# Patient Record
Sex: FEMALE | Race: WHITE | NOT HISPANIC OR LATINO | Employment: FULL TIME | URBAN - METROPOLITAN AREA
[De-identification: names, ages, dates, MRNs, and addresses within clinical notes are randomized per-mention and may not be internally consistent; named-entity substitution may affect disease eponyms.]

---

## 2019-08-19 ENCOUNTER — PREP FOR SURGERY (OUTPATIENT)
Dept: OTHER | Facility: HOSPITAL | Age: 44
End: 2019-08-19

## 2019-08-19 DIAGNOSIS — D25.9 FIBROID UTERUS: Primary | ICD-10-CM

## 2019-08-19 RX ORDER — CEFAZOLIN SODIUM 2 G/100ML
2 INJECTION, SOLUTION INTRAVENOUS ONCE
Status: CANCELLED | OUTPATIENT
Start: 2019-08-19 | End: 2019-08-19

## 2019-08-19 RX ORDER — SCOLOPAMINE TRANSDERMAL SYSTEM 1 MG/1
1 PATCH, EXTENDED RELEASE TRANSDERMAL ONCE
Status: CANCELLED | OUTPATIENT
Start: 2019-08-19 | End: 2019-08-19

## 2019-08-19 RX ORDER — HEPARIN SODIUM 5000 [USP'U]/ML
5000 INJECTION, SOLUTION INTRAVENOUS; SUBCUTANEOUS ONCE
Status: CANCELLED | OUTPATIENT
Start: 2019-08-19 | End: 2019-08-19

## 2019-08-20 ENCOUNTER — APPOINTMENT (OUTPATIENT)
Dept: PREADMISSION TESTING | Facility: HOSPITAL | Age: 44
End: 2019-08-20

## 2019-08-20 ENCOUNTER — PREP FOR SURGERY (OUTPATIENT)
Dept: OTHER | Facility: HOSPITAL | Age: 44
End: 2019-08-20

## 2019-08-20 VITALS — BODY MASS INDEX: 30.1 KG/M2 | HEIGHT: 67 IN | WEIGHT: 191.8 LBS

## 2019-08-20 DIAGNOSIS — Z01.89 LABORATORY TEST: Primary | ICD-10-CM

## 2019-08-20 DIAGNOSIS — D25.9 FIBROID UTERUS: ICD-10-CM

## 2019-08-20 DIAGNOSIS — D25.9 UTERINE LEIOMYOMA, UNSPECIFIED LOCATION: ICD-10-CM

## 2019-08-20 LAB
ABO GROUP BLD: NORMAL
B-HCG UR QL: NEGATIVE
BILIRUB UR QL STRIP: NEGATIVE
CLARITY UR: CLEAR
COLOR UR: YELLOW
DEPRECATED RDW RBC AUTO: 47.2 FL (ref 37–54)
ERYTHROCYTE [DISTWIDTH] IN BLOOD BY AUTOMATED COUNT: 14.1 % (ref 12.3–15.4)
GLUCOSE UR STRIP-MCNC: NEGATIVE MG/DL
HCT VFR BLD AUTO: 39.2 % (ref 34–46.6)
HGB BLD-MCNC: 11.9 G/DL (ref 12–15.9)
HGB UR QL STRIP.AUTO: NEGATIVE
KETONES UR QL STRIP: NEGATIVE
LEUKOCYTE ESTERASE UR QL STRIP.AUTO: NEGATIVE
MCH RBC QN AUTO: 27.5 PG (ref 26.6–33)
MCHC RBC AUTO-ENTMCNC: 30.4 G/DL (ref 31.5–35.7)
MCV RBC AUTO: 90.7 FL (ref 79–97)
NITRITE UR QL STRIP: NEGATIVE
PH UR STRIP.AUTO: 7 [PH] (ref 5–8)
PLATELET # BLD AUTO: 254 10*3/MM3 (ref 140–450)
PMV BLD AUTO: 9.9 FL (ref 6–12)
PROT UR QL STRIP: NEGATIVE
RBC # BLD AUTO: 4.32 10*6/MM3 (ref 3.77–5.28)
RH BLD: POSITIVE
SP GR UR STRIP: 1.01 (ref 1–1.03)
UROBILINOGEN UR QL STRIP: NORMAL
WBC NRBC COR # BLD: 5.86 10*3/MM3 (ref 3.4–10.8)

## 2019-08-20 PROCEDURE — 85027 COMPLETE CBC AUTOMATED: CPT | Performed by: OBSTETRICS & GYNECOLOGY

## 2019-08-20 PROCEDURE — 93010 ELECTROCARDIOGRAM REPORT: CPT | Performed by: INTERNAL MEDICINE

## 2019-08-20 PROCEDURE — 86900 BLOOD TYPING SEROLOGIC ABO: CPT

## 2019-08-20 PROCEDURE — 93005 ELECTROCARDIOGRAM TRACING: CPT

## 2019-08-20 PROCEDURE — 81003 URINALYSIS AUTO W/O SCOPE: CPT | Performed by: OBSTETRICS & GYNECOLOGY

## 2019-08-20 PROCEDURE — 81025 URINE PREGNANCY TEST: CPT | Performed by: OBSTETRICS & GYNECOLOGY

## 2019-08-20 PROCEDURE — 86901 BLOOD TYPING SEROLOGIC RH(D): CPT

## 2019-08-20 PROCEDURE — 36415 COLL VENOUS BLD VENIPUNCTURE: CPT

## 2019-08-20 RX ORDER — ACETAMINOPHEN 160 MG
1 TABLET,DISINTEGRATING ORAL EVERY MORNING
COMMUNITY

## 2019-08-20 RX ORDER — LISINOPRIL AND HYDROCHLOROTHIAZIDE 25; 20 MG/1; MG/1
1 TABLET ORAL EVERY MORNING
COMMUNITY

## 2019-08-20 RX ORDER — ALPRAZOLAM 0.25 MG/1
0.25 TABLET ORAL 3 TIMES DAILY PRN
COMMUNITY

## 2019-08-20 RX ORDER — METHIMAZOLE 10 MG/1
5 TABLET ORAL TAKE AS DIRECTED
COMMUNITY
End: 2019-11-26 | Stop reason: DRUGHIGH

## 2019-08-20 RX ORDER — DOXYCYCLINE HYCLATE 50 MG/1
324 CAPSULE, GELATIN COATED ORAL
COMMUNITY

## 2019-08-20 NOTE — H&P
Visit Type:  Preoperative visit  Primary Provider:  Jeni Floyd DO      History of Present Illness:  Lorraine presents with her  for her pre-operative visit for her TRH with bilateral salpingectomy for her fibroid uterus and menorraghia. All questions were answered today.      Review of Systems            Complains of abnormal/painful periods and abnormal bleeding.       Denies abnormal vaginal discharge, vaginal itching or burning, urinary incontinence, urinary urgency, urinary frequency, pelvic pain, bloody urine, vaginal dryness, painful urination and painful intercourse.    Except as noted in the HPI, the review of systems is negative for General, CV, Resp, GI, Endo, Derm, Psych and ENT.    Birth Control Method: Not Applicable  Age at Menarche: 13  Date of LMP: 2019  Interval Btwn Periods: 28  Menses: Medium  Bleeding between Periods: No    OB History     T: 2  L: 2  Past Medical History: Abnormal Pap, Acid Reflux, Anxiety, Fibroid Uterine, HTN, and Hyperthyroid.   Past Surgical History: Gallbladder, Liver duct repair, and Left breast biopsy.      Past Medical History:     Reviewed history from 2019 and no changes required:        Abnormal Pap, Acid Reflux, Anxiety, Fibroid Uterine, HTN, and Hyperthyroid.     Past Surgical History:     Reviewed history from 2019 and no changes required:        Gallbladder, Liver duct repair, and Left breast biopsy.          Risk Factors:     Smoked Tobacco Use:  Never smoker  Smokeless Tobacco Use:  Never  Passive smoke exposure:  no  Drug use:  no  Alcohol use:  no  Sun Exposure:  occasionally    Previous Tobacco Use: Signed On 2019  Smoked Tobacco Use:  Never smoker  Smokeless Tobacco Use:  Never  Passive smoke exposure:  no  Drug use:  no    Previous Alcohol Use: Signed On 2019  Alcohol use:  no  Sun Exposure:  occasionally      Vital Signs:    Patient Profile: 44 Years Old Female  Height:  67 inches (170.18 cm)  Weight:    194  pounds  BMI:  30.38  Pulse rate: 80 / minute  BP sittin / 708      Physical Exam    General:      well developed, well nourished, in no acute distress  Lungs:      clear bilaterally to A & P  Heart:      regular rate and rhythm, S1, S2 without murmurs, rubs, gallops, or clicks  Abdomen:      bowel sounds positive; abdomen soft and non-tender without masses, organomegaly, or hernias noted  Genitalia:      normal female external genitalia  Msk:      no deformity or scoliosis noted with normal posture and gait  Psych:      alert and cooperative; normal mood and affect; normal attention span and concentration        Impression & Recommendations:    Problem # 1:  Fibroids, uterus (ICD-218.9) (ERP81-D89.9)  Patient's diagnosis and possible treatment options were again reviewed, including lesser invasive options.  The risks, benefits and likely outcomes of each were reviewed.  The patient confirms her desire to proceed with TRH with possible BSO.  The procedure was again defined as robotic removal of uterus, cervix and possible removal of the fallopian tubes and ovaries.  She has asked me to use my judgement the the decision to remove the ovaries at the time of surgical exploration.  I have informed her that clinical suspicion of endometriosis cannot always be confirmed with pathological examination of the surgical specimen.  Recognized risks of this procedure are those of any laparoscopic surgical procedure including anesthesia, bleeding, infection, damage to bowel or bladder and neurologic damage.  Specific risks of this procedure were also reviewed including ureteral damage, DVT and its sequelae, vaginal cuff hematoma or infection, wound dehiscence, ovarian remnants, as well as the general management of these complications.  She was informed of the possible need to convert the laparoscopic procedure to an open procedure after the induction of anesthesia.  Possible indications for post operative ERT and its risks  and benefits were reviewed.  She was also advised that bleeding, bowel injury, bladder injury or ureteral injury may not be obvious at the time of surgery and that she would need to be viligent to call my office ASAP with any increasing abdominopelvic pain or any fever in the two weeks following surgery.  All of the patients questions were answered and she was advised to call with any other questions that she may have prior to the operation.    Orders:  Pre Op Visit-[id number] (CPT-[id number])        New Orders:  Pre Op Visit-[id number] [[id number]]      Problems Added:    Fibroids, uterus (ICD-218.9) ([id number]-H95.9)  ]      Electronically signed by Jeni Floyd DO on 08/20/2019 at 5:24 PM    ________________________________________________________________________

## 2019-08-20 NOTE — PAT
Too early to draw type and screen and potassium in PAT.  Please obtain specimen in pre-op on the day of surgery.    Patient to apply Chlorhexadine wipes  to surgical area (as instructed) the night before procedure and the AM of procedure. Wipes provided.    Latex allergy.       When verifying op consent with patient.  Patient stated it was incorrect in computer and Dr. Floyd told her to tell  So to be corrected in PAT.   She plans to keep her Ovaries.       Called and spoke to Sadi Lim, gave correct order for operative permit that matches case request.     Incorrect order removed from Epic and correct order entered.

## 2019-08-23 ENCOUNTER — PREP FOR SURGERY (OUTPATIENT)
Dept: OTHER | Facility: HOSPITAL | Age: 44
End: 2019-08-23

## 2019-08-23 NOTE — H&P
Visit Type:  Preoperative visit  Primary Provider:  Jeni Floyd DO      History of Present Illness:  Lorraine presents with her  for her pre-operative visit for her TRH with bilateral salpingectomy for her fibroid uterus and menorraghia. All questions were answered today.      Review of Systems            Complains of abnormal/painful periods and abnormal bleeding.       Denies abnormal vaginal discharge, vaginal itching or burning, urinary incontinence, urinary urgency, urinary frequency, pelvic pain, bloody urine, vaginal dryness, painful urination and painful intercourse.    Except as noted in the HPI, the review of systems is negative for General, CV, Resp, GI, Endo, Derm, Psych and ENT.    Birth Control Method: Not Applicable  Age at Menarche: 13  Date of LMP: 2019  Interval Btwn Periods: 28  Menses: Medium  Bleeding between Periods: No    OB History     T: 2  L: 2  Past Medical History: Abnormal Pap, Acid Reflux, Anxiety, Fibroid Uterine, HTN, and Hyperthyroid.   Past Surgical History: Gallbladder, Liver duct repair, and Left breast biopsy.      Past Medical History:     Reviewed history from 2019 and no changes required:        Abnormal Pap, Acid Reflux, Anxiety, Fibroid Uterine, HTN, and Hyperthyroid.     Past Surgical History:     Reviewed history from 2019 and no changes required:        Gallbladder, Liver duct repair, and Left breast biopsy.          Risk Factors:     Smoked Tobacco Use:  Never smoker  Smokeless Tobacco Use:  Never  Passive smoke exposure:  no  Drug use:  no  Alcohol use:  no  Sun Exposure:  occasionally    Previous Tobacco Use: Signed On 2019  Smoked Tobacco Use:  Never smoker  Smokeless Tobacco Use:  Never  Passive smoke exposure:  no  Drug use:  no    Previous Alcohol Use: Signed On 2019  Alcohol use:  no  Sun Exposure:  occasionally      Vital Signs:    Patient Profile: 44 Years Old Female  Height:  67 inches (170.18 cm)  Weight:    194  pounds  BMI:  30.38  Pulse rate: 80 / minute  BP sittin / 708      Physical Exam    General:      well developed, well nourished, in no acute distress  Lungs:      clear bilaterally to A & P  Heart:      regular rate and rhythm, S1, S2 without murmurs, rubs, gallops, or clicks  Abdomen:      bowel sounds positive; abdomen soft and non-tender without masses, organomegaly, or hernias noted  Genitalia:      normal female external genitalia  Msk:      no deformity or scoliosis noted with normal posture and gait  Psych:      alert and cooperative; normal mood and affect; normal attention span and concentration        Impression & Recommendations:    Problem # 1:  Fibroids, uterus (ICD-218.9) (KYU31-H58.9)  Patient's diagnosis and possible treatment options were again reviewed, including lesser invasive options.  The risks, benefits and likely outcomes of each were reviewed.  The patient confirms her desire to proceed with TRH with possible BSO.  The procedure was again defined as robotic removal of uterus, cervix and possible removal of the fallopian tubes and ovaries.  She has asked me to use my judgement the the decision to remove the ovaries at the time of surgical exploration.  I have informed her that clinical suspicion of endometriosis cannot always be confirmed with pathological examination of the surgical specimen.  Recognized risks of this procedure are those of any laparoscopic surgical procedure including anesthesia, bleeding, infection, damage to bowel or bladder and neurologic damage.  Specific risks of this procedure were also reviewed including ureteral damage, DVT and its sequelae, vaginal cuff hematoma or infection, wound dehiscence, ovarian remnants, as well as the general management of these complications.  She was informed of the possible need to convert the laparoscopic procedure to an open procedure after the induction of anesthesia.  Possible indications for post operative ERT and its risks  and benefits were reviewed.  She was also advised that bleeding, bowel injury, bladder injury or ureteral injury may not be obvious at the time of surgery and that she would need to be viligent to call my office ASAP with any increasing abdominopelvic pain or any fever in the two weeks following surgery.  All of the patients questions were answered and she was advised to call with any other questions that she may have prior to the operation.    Orders:  Pre Op Visit-[id number] (CPT-[id number])        New Orders:  Pre Op Visit-[id number] [CPT-[id number]]      Problems Added:    Fibroids, uterus (ICD-218.9) (PGT28-T34.9)  ]      Electronically signed by Jeni Floyd DO on 08/20/2019 at 5:24 PM    ________________________________________________________________________

## 2019-08-25 ENCOUNTER — ANESTHESIA EVENT (OUTPATIENT)
Dept: PERIOP | Facility: HOSPITAL | Age: 44
End: 2019-08-25

## 2019-08-25 RX ORDER — FAMOTIDINE 10 MG/ML
20 INJECTION, SOLUTION INTRAVENOUS ONCE
Status: CANCELLED | OUTPATIENT
Start: 2019-08-25 | End: 2019-08-25

## 2019-08-26 ENCOUNTER — HOSPITAL ENCOUNTER (INPATIENT)
Facility: HOSPITAL | Age: 44
LOS: 1 days | Discharge: HOME | End: 2019-08-27
Attending: OBSTETRICS & GYNECOLOGY | Admitting: OBSTETRICS & GYNECOLOGY

## 2019-08-26 ENCOUNTER — ANESTHESIA (OUTPATIENT)
Dept: PERIOP | Facility: HOSPITAL | Age: 44
End: 2019-08-26

## 2019-08-26 DIAGNOSIS — D25.9 FIBROID UTERUS: ICD-10-CM

## 2019-08-26 DIAGNOSIS — N92.0 MENORRHAGIA: ICD-10-CM

## 2019-08-26 LAB
ABO GROUP BLD: NORMAL
B-HCG UR QL: NEGATIVE
BLD GP AB SCN SERPL QL: NEGATIVE
INTERNAL NEGATIVE CONTROL: NEGATIVE
INTERNAL POSITIVE CONTROL: POSITIVE
Lab: NORMAL
POTASSIUM BLD-SCNC: 4.2 MMOL/L (ref 3.5–5.2)
RH BLD: POSITIVE
T&S EXPIRATION DATE: NORMAL

## 2019-08-26 PROCEDURE — 25010000002 KETOROLAC TROMETHAMINE PER 15 MG: Performed by: NURSE ANESTHETIST, CERTIFIED REGISTERED

## 2019-08-26 PROCEDURE — 25010000002 FENTANYL CITRATE (PF) 100 MCG/2ML SOLUTION: Performed by: NURSE ANESTHETIST, CERTIFIED REGISTERED

## 2019-08-26 PROCEDURE — 25010000002 HEPARIN (PORCINE) PER 1000 UNITS: Performed by: OBSTETRICS & GYNECOLOGY

## 2019-08-26 PROCEDURE — 25010000002 BUPRENORPHINE PER 0.1 MG: Performed by: ANESTHESIOLOGY

## 2019-08-26 PROCEDURE — 25010000003 CEFAZOLIN PER 500 MG: Performed by: NURSE ANESTHETIST, CERTIFIED REGISTERED

## 2019-08-26 PROCEDURE — 0UT70ZZ RESECTION OF BILATERAL FALLOPIAN TUBES, OPEN APPROACH: ICD-10-PCS | Performed by: OBSTETRICS & GYNECOLOGY

## 2019-08-26 PROCEDURE — 25010000002 ONDANSETRON PER 1 MG: Performed by: NURSE ANESTHETIST, CERTIFIED REGISTERED

## 2019-08-26 PROCEDURE — 25010000002 DEXAMETHASONE SODIUM PHOSPHATE 10 MG/ML SOLUTION: Performed by: ANESTHESIOLOGY

## 2019-08-26 PROCEDURE — 86900 BLOOD TYPING SEROLOGIC ABO: CPT | Performed by: OBSTETRICS & GYNECOLOGY

## 2019-08-26 PROCEDURE — 84132 ASSAY OF SERUM POTASSIUM: CPT | Performed by: ANESTHESIOLOGY

## 2019-08-26 PROCEDURE — 25010000002 DEXAMETHASONE PER 1 MG: Performed by: NURSE ANESTHETIST, CERTIFIED REGISTERED

## 2019-08-26 PROCEDURE — 81025 URINE PREGNANCY TEST: CPT | Performed by: ANESTHESIOLOGY

## 2019-08-26 PROCEDURE — 0UT90ZZ RESECTION OF UTERUS, OPEN APPROACH: ICD-10-PCS | Performed by: OBSTETRICS & GYNECOLOGY

## 2019-08-26 PROCEDURE — 86850 RBC ANTIBODY SCREEN: CPT | Performed by: OBSTETRICS & GYNECOLOGY

## 2019-08-26 PROCEDURE — 86901 BLOOD TYPING SEROLOGIC RH(D): CPT | Performed by: OBSTETRICS & GYNECOLOGY

## 2019-08-26 PROCEDURE — 25010000002 PROPOFOL 10 MG/ML EMULSION: Performed by: NURSE ANESTHETIST, CERTIFIED REGISTERED

## 2019-08-26 PROCEDURE — 88307 TISSUE EXAM BY PATHOLOGIST: CPT | Performed by: OBSTETRICS & GYNECOLOGY

## 2019-08-26 PROCEDURE — 25010000002 PROPOFOL 1000 MG/ML EMULSION: Performed by: NURSE ANESTHETIST, CERTIFIED REGISTERED

## 2019-08-26 PROCEDURE — 0UJD4ZZ INSPECTION OF UTERUS AND CERVIX, PERCUTANEOUS ENDOSCOPIC APPROACH: ICD-10-PCS | Performed by: OBSTETRICS & GYNECOLOGY

## 2019-08-26 PROCEDURE — 25010000003 LIDOCAINE 1 % SOLUTION: Performed by: NURSE ANESTHETIST, CERTIFIED REGISTERED

## 2019-08-26 PROCEDURE — 25010000002 FENTANYL CITRATE (PF) 500 MCG/10ML SOLUTION: Performed by: OBSTETRICS & GYNECOLOGY

## 2019-08-26 PROCEDURE — 25010000003 CEFAZOLIN IN DEXTROSE 2-4 GM/100ML-% SOLUTION: Performed by: OBSTETRICS & GYNECOLOGY

## 2019-08-26 RX ORDER — ALPRAZOLAM 0.5 MG/1
0.25 TABLET ORAL 3 TIMES DAILY PRN
Status: DISCONTINUED | OUTPATIENT
Start: 2019-08-26 | End: 2019-08-27 | Stop reason: HOSPADM

## 2019-08-26 RX ORDER — ONDANSETRON 2 MG/ML
INJECTION INTRAMUSCULAR; INTRAVENOUS AS NEEDED
Status: DISCONTINUED | OUTPATIENT
Start: 2019-08-26 | End: 2019-08-26 | Stop reason: SURG

## 2019-08-26 RX ORDER — LIDOCAINE HYDROCHLORIDE 10 MG/ML
INJECTION, SOLUTION INFILTRATION; PERINEURAL AS NEEDED
Status: DISCONTINUED | OUTPATIENT
Start: 2019-08-26 | End: 2019-08-26 | Stop reason: SURG

## 2019-08-26 RX ORDER — HEPARIN SODIUM 5000 [USP'U]/ML
INJECTION, SOLUTION INTRAVENOUS; SUBCUTANEOUS AS NEEDED
Status: DISCONTINUED | OUTPATIENT
Start: 2019-08-26 | End: 2019-08-26 | Stop reason: HOSPADM

## 2019-08-26 RX ORDER — ONDANSETRON 2 MG/ML
4 INJECTION INTRAMUSCULAR; INTRAVENOUS ONCE AS NEEDED
Status: DISCONTINUED | OUTPATIENT
Start: 2019-08-26 | End: 2019-08-26 | Stop reason: HOSPADM

## 2019-08-26 RX ORDER — SODIUM CHLORIDE 9 MG/ML
INJECTION, SOLUTION INTRAVENOUS AS NEEDED
Status: DISCONTINUED | OUTPATIENT
Start: 2019-08-26 | End: 2019-08-26 | Stop reason: HOSPADM

## 2019-08-26 RX ORDER — FAMOTIDINE 20 MG/1
20 TABLET, FILM COATED ORAL ONCE
Status: COMPLETED | OUTPATIENT
Start: 2019-08-26 | End: 2019-08-26

## 2019-08-26 RX ORDER — ATRACURIUM BESYLATE 10 MG/ML
INJECTION, SOLUTION INTRAVENOUS AS NEEDED
Status: DISCONTINUED | OUTPATIENT
Start: 2019-08-26 | End: 2019-08-26 | Stop reason: SURG

## 2019-08-26 RX ORDER — PROMETHAZINE HYDROCHLORIDE 25 MG/ML
12.5 INJECTION, SOLUTION INTRAMUSCULAR; INTRAVENOUS EVERY 6 HOURS PRN
Status: DISCONTINUED | OUTPATIENT
Start: 2019-08-26 | End: 2019-08-27 | Stop reason: HOSPADM

## 2019-08-26 RX ORDER — BUPIVACAINE HYDROCHLORIDE 2.5 MG/ML
INJECTION, SOLUTION EPIDURAL; INFILTRATION; INTRACAUDAL
Status: COMPLETED | OUTPATIENT
Start: 2019-08-26 | End: 2019-08-26

## 2019-08-26 RX ORDER — KETOROLAC TROMETHAMINE 30 MG/ML
INJECTION, SOLUTION INTRAMUSCULAR; INTRAVENOUS AS NEEDED
Status: DISCONTINUED | OUTPATIENT
Start: 2019-08-26 | End: 2019-08-26 | Stop reason: SURG

## 2019-08-26 RX ORDER — MAGNESIUM HYDROXIDE 1200 MG/15ML
LIQUID ORAL AS NEEDED
Status: DISCONTINUED | OUTPATIENT
Start: 2019-08-26 | End: 2019-08-26 | Stop reason: HOSPADM

## 2019-08-26 RX ORDER — FENTANYL CITRATE 50 UG/ML
50 INJECTION, SOLUTION INTRAMUSCULAR; INTRAVENOUS
Status: DISCONTINUED | OUTPATIENT
Start: 2019-08-26 | End: 2019-08-26 | Stop reason: HOSPADM

## 2019-08-26 RX ORDER — PROMETHAZINE HYDROCHLORIDE 12.5 MG/1
12.5 TABLET ORAL EVERY 6 HOURS PRN
Status: DISCONTINUED | OUTPATIENT
Start: 2019-08-26 | End: 2019-08-27 | Stop reason: HOSPADM

## 2019-08-26 RX ORDER — IBUPROFEN 200 MG
400 TABLET ORAL EVERY 6 HOURS PRN
Status: DISCONTINUED | OUTPATIENT
Start: 2019-08-26 | End: 2019-08-27 | Stop reason: HOSPADM

## 2019-08-26 RX ORDER — DEXAMETHASONE SODIUM PHOSPHATE 10 MG/ML
INJECTION, SOLUTION INTRAMUSCULAR; INTRAVENOUS
Status: COMPLETED | OUTPATIENT
Start: 2019-08-26 | End: 2019-08-26

## 2019-08-26 RX ORDER — PANTOPRAZOLE SODIUM 40 MG/1
40 TABLET, DELAYED RELEASE ORAL
Status: DISCONTINUED | OUTPATIENT
Start: 2019-08-27 | End: 2019-08-27 | Stop reason: HOSPADM

## 2019-08-26 RX ORDER — SODIUM CHLORIDE 0.9 % (FLUSH) 0.9 %
3 SYRINGE (ML) INJECTION EVERY 12 HOURS SCHEDULED
Status: DISCONTINUED | OUTPATIENT
Start: 2019-08-26 | End: 2019-08-26 | Stop reason: HOSPADM

## 2019-08-26 RX ORDER — PROMETHAZINE HYDROCHLORIDE 25 MG/ML
12.5 INJECTION, SOLUTION INTRAMUSCULAR; INTRAVENOUS ONCE AS NEEDED
Status: DISCONTINUED | OUTPATIENT
Start: 2019-08-26 | End: 2019-08-26 | Stop reason: HOSPADM

## 2019-08-26 RX ORDER — SCOLOPAMINE TRANSDERMAL SYSTEM 1 MG/1
1 PATCH, EXTENDED RELEASE TRANSDERMAL ONCE
Status: DISCONTINUED | OUTPATIENT
Start: 2019-08-26 | End: 2019-08-26

## 2019-08-26 RX ORDER — ONDANSETRON 4 MG/1
4 TABLET, FILM COATED ORAL EVERY 6 HOURS PRN
Status: DISCONTINUED | OUTPATIENT
Start: 2019-08-26 | End: 2019-08-27 | Stop reason: HOSPADM

## 2019-08-26 RX ORDER — PROPOFOL 10 MG/ML
VIAL (ML) INTRAVENOUS AS NEEDED
Status: DISCONTINUED | OUTPATIENT
Start: 2019-08-26 | End: 2019-08-26 | Stop reason: SURG

## 2019-08-26 RX ORDER — METHIMAZOLE 10 MG/1
5 TABLET ORAL
Status: DISCONTINUED | OUTPATIENT
Start: 2019-08-26 | End: 2019-08-27 | Stop reason: HOSPADM

## 2019-08-26 RX ORDER — GLYCOPYRROLATE 0.2 MG/ML
INJECTION INTRAMUSCULAR; INTRAVENOUS AS NEEDED
Status: DISCONTINUED | OUTPATIENT
Start: 2019-08-26 | End: 2019-08-26 | Stop reason: SURG

## 2019-08-26 RX ORDER — ALUMINA, MAGNESIA, AND SIMETHICONE 2400; 2400; 240 MG/30ML; MG/30ML; MG/30ML
15 SUSPENSION ORAL EVERY 6 HOURS PRN
Status: DISCONTINUED | OUTPATIENT
Start: 2019-08-26 | End: 2019-08-27 | Stop reason: HOSPADM

## 2019-08-26 RX ORDER — PROMETHAZINE HYDROCHLORIDE 12.5 MG/1
12.5 SUPPOSITORY RECTAL EVERY 6 HOURS PRN
Status: DISCONTINUED | OUTPATIENT
Start: 2019-08-26 | End: 2019-08-27 | Stop reason: HOSPADM

## 2019-08-26 RX ORDER — SODIUM CHLORIDE, SODIUM LACTATE, POTASSIUM CHLORIDE, CALCIUM CHLORIDE 600; 310; 30; 20 MG/100ML; MG/100ML; MG/100ML; MG/100ML
9 INJECTION, SOLUTION INTRAVENOUS CONTINUOUS
Status: DISCONTINUED | OUTPATIENT
Start: 2019-08-26 | End: 2019-08-27 | Stop reason: HOSPADM

## 2019-08-26 RX ORDER — CEFAZOLIN SODIUM 2 G/100ML
2 INJECTION, SOLUTION INTRAVENOUS ONCE
Status: COMPLETED | OUTPATIENT
Start: 2019-08-26 | End: 2019-08-26

## 2019-08-26 RX ORDER — PROMETHAZINE HYDROCHLORIDE 25 MG/1
25 SUPPOSITORY RECTAL ONCE AS NEEDED
Status: DISCONTINUED | OUTPATIENT
Start: 2019-08-26 | End: 2019-08-26 | Stop reason: HOSPADM

## 2019-08-26 RX ORDER — NALOXONE HCL 0.4 MG/ML
0.1 VIAL (ML) INJECTION
Status: DISCONTINUED | OUTPATIENT
Start: 2019-08-26 | End: 2019-08-27 | Stop reason: HOSPADM

## 2019-08-26 RX ORDER — SIMETHICONE 80 MG
80 TABLET,CHEWABLE ORAL 4 TIMES DAILY PRN
Status: DISCONTINUED | OUTPATIENT
Start: 2019-08-26 | End: 2019-08-27 | Stop reason: HOSPADM

## 2019-08-26 RX ORDER — ONDANSETRON 2 MG/ML
4 INJECTION INTRAMUSCULAR; INTRAVENOUS EVERY 6 HOURS PRN
Status: DISCONTINUED | OUTPATIENT
Start: 2019-08-26 | End: 2019-08-27 | Stop reason: HOSPADM

## 2019-08-26 RX ORDER — DEXAMETHASONE SODIUM PHOSPHATE 4 MG/ML
INJECTION, SOLUTION INTRA-ARTICULAR; INTRALESIONAL; INTRAMUSCULAR; INTRAVENOUS; SOFT TISSUE AS NEEDED
Status: DISCONTINUED | OUTPATIENT
Start: 2019-08-26 | End: 2019-08-26 | Stop reason: SURG

## 2019-08-26 RX ORDER — HYDROCODONE BITARTRATE AND ACETAMINOPHEN 5; 325 MG/1; MG/1
1 TABLET ORAL EVERY 4 HOURS PRN
Status: DISCONTINUED | OUTPATIENT
Start: 2019-08-26 | End: 2019-08-27 | Stop reason: HOSPADM

## 2019-08-26 RX ORDER — PROMETHAZINE HYDROCHLORIDE 25 MG/1
25 TABLET ORAL ONCE AS NEEDED
Status: DISCONTINUED | OUTPATIENT
Start: 2019-08-26 | End: 2019-08-26 | Stop reason: HOSPADM

## 2019-08-26 RX ORDER — ACETAMINOPHEN 650 MG/1
650 SUPPOSITORY RECTAL EVERY 4 HOURS PRN
Status: DISCONTINUED | OUTPATIENT
Start: 2019-08-26 | End: 2019-08-27 | Stop reason: HOSPADM

## 2019-08-26 RX ORDER — NEOSTIGMINE METHYLSULFATE 5 MG/5 ML
SYRINGE (ML) INTRAVENOUS AS NEEDED
Status: DISCONTINUED | OUTPATIENT
Start: 2019-08-26 | End: 2019-08-26 | Stop reason: SURG

## 2019-08-26 RX ORDER — SODIUM CHLORIDE 0.9 % (FLUSH) 0.9 %
3-10 SYRINGE (ML) INJECTION AS NEEDED
Status: DISCONTINUED | OUTPATIENT
Start: 2019-08-26 | End: 2019-08-26 | Stop reason: HOSPADM

## 2019-08-26 RX ORDER — BUPIVACAINE HYDROCHLORIDE AND EPINEPHRINE 2.5; 5 MG/ML; UG/ML
INJECTION, SOLUTION EPIDURAL; INFILTRATION; INTRACAUDAL; PERINEURAL AS NEEDED
Status: DISCONTINUED | OUTPATIENT
Start: 2019-08-26 | End: 2019-08-26 | Stop reason: HOSPADM

## 2019-08-26 RX ORDER — HEPARIN SODIUM 5000 [USP'U]/ML
5000 INJECTION, SOLUTION INTRAVENOUS; SUBCUTANEOUS ONCE
Status: DISCONTINUED | OUTPATIENT
Start: 2019-08-26 | End: 2019-08-26 | Stop reason: HOSPADM

## 2019-08-26 RX ORDER — FENTANYL CITRATE 50 UG/ML
INJECTION, SOLUTION INTRAMUSCULAR; INTRAVENOUS AS NEEDED
Status: DISCONTINUED | OUTPATIENT
Start: 2019-08-26 | End: 2019-08-26 | Stop reason: SURG

## 2019-08-26 RX ORDER — CEFAZOLIN SODIUM 1 G/3ML
INJECTION, POWDER, FOR SOLUTION INTRAMUSCULAR; INTRAVENOUS AS NEEDED
Status: DISCONTINUED | OUTPATIENT
Start: 2019-08-26 | End: 2019-08-26 | Stop reason: SURG

## 2019-08-26 RX ORDER — BUPRENORPHINE HYDROCHLORIDE 0.32 MG/ML
INJECTION INTRAMUSCULAR; INTRAVENOUS
Status: COMPLETED | OUTPATIENT
Start: 2019-08-26 | End: 2019-08-26

## 2019-08-26 RX ORDER — ACETAMINOPHEN 160 MG/5ML
650 SOLUTION ORAL EVERY 4 HOURS PRN
Status: DISCONTINUED | OUTPATIENT
Start: 2019-08-26 | End: 2019-08-27 | Stop reason: HOSPADM

## 2019-08-26 RX ORDER — LIDOCAINE HYDROCHLORIDE 10 MG/ML
0.5 INJECTION, SOLUTION EPIDURAL; INFILTRATION; INTRACAUDAL; PERINEURAL ONCE AS NEEDED
Status: DISCONTINUED | OUTPATIENT
Start: 2019-08-26 | End: 2019-08-26

## 2019-08-26 RX ORDER — ACETAMINOPHEN 325 MG/1
650 TABLET ORAL EVERY 4 HOURS PRN
Status: DISCONTINUED | OUTPATIENT
Start: 2019-08-26 | End: 2019-08-27 | Stop reason: HOSPADM

## 2019-08-26 RX ADMIN — Medication 4 MG: at 14:15

## 2019-08-26 RX ADMIN — KETOROLAC TROMETHAMINE 30 MG: 30 INJECTION, SOLUTION INTRAMUSCULAR at 14:07

## 2019-08-26 RX ADMIN — ATRACURIUM BESYLATE 10 MG: 10 INJECTION, SOLUTION INTRAVENOUS at 13:29

## 2019-08-26 RX ADMIN — METHIMAZOLE 5 MG: 10 TABLET ORAL at 21:32

## 2019-08-26 RX ADMIN — SODIUM CHLORIDE, POTASSIUM CHLORIDE, SODIUM LACTATE AND CALCIUM CHLORIDE 9 ML/HR: 600; 310; 30; 20 INJECTION, SOLUTION INTRAVENOUS at 08:57

## 2019-08-26 RX ADMIN — SODIUM CHLORIDE, POTASSIUM CHLORIDE, SODIUM LACTATE AND CALCIUM CHLORIDE 500 ML: 600; 310; 30; 20 INJECTION, SOLUTION INTRAVENOUS at 21:56

## 2019-08-26 RX ADMIN — FAMOTIDINE 20 MG: 20 TABLET ORAL at 08:58

## 2019-08-26 RX ADMIN — PROPOFOL 180 MG: 10 INJECTION, EMULSION INTRAVENOUS at 11:21

## 2019-08-26 RX ADMIN — ATRACURIUM BESYLATE 40 MG: 10 INJECTION, SOLUTION INTRAVENOUS at 11:21

## 2019-08-26 RX ADMIN — SODIUM CHLORIDE, POTASSIUM CHLORIDE, SODIUM LACTATE AND CALCIUM CHLORIDE: 600; 310; 30; 20 INJECTION, SOLUTION INTRAVENOUS at 12:41

## 2019-08-26 RX ADMIN — FENTANYL CITRATE 50 MCG: 50 INJECTION, SOLUTION INTRAMUSCULAR; INTRAVENOUS at 11:21

## 2019-08-26 RX ADMIN — FENTANYL CITRATE 50 MCG: 50 INJECTION INTRAMUSCULAR; INTRAVENOUS at 15:00

## 2019-08-26 RX ADMIN — ONDANSETRON 4 MG: 2 INJECTION INTRAMUSCULAR; INTRAVENOUS at 13:58

## 2019-08-26 RX ADMIN — DEXAMETHASONE SODIUM PHOSPHATE 8 MG: 4 INJECTION, SOLUTION INTRAMUSCULAR; INTRAVENOUS at 11:32

## 2019-08-26 RX ADMIN — BUPIVACAINE HYDROCHLORIDE 60 ML: 2.5 INJECTION, SOLUTION EPIDURAL; INFILTRATION; INTRACAUDAL; PERINEURAL at 14:29

## 2019-08-26 RX ADMIN — FENTANYL CITRATE 50 MCG: 50 INJECTION INTRAMUSCULAR; INTRAVENOUS at 15:30

## 2019-08-26 RX ADMIN — FENTANYL CITRATE: 50 INJECTION, SOLUTION INTRAMUSCULAR; INTRAVENOUS at 15:40

## 2019-08-26 RX ADMIN — FENTANYL CITRATE 50 MCG: 50 INJECTION, SOLUTION INTRAMUSCULAR; INTRAVENOUS at 13:57

## 2019-08-26 RX ADMIN — ATRACURIUM BESYLATE 10 MG: 10 INJECTION, SOLUTION INTRAVENOUS at 12:19

## 2019-08-26 RX ADMIN — CEFAZOLIN 2 G: 1 INJECTION, POWDER, FOR SOLUTION INTRAVENOUS at 14:15

## 2019-08-26 RX ADMIN — LISINOPRIL: 10 TABLET ORAL at 21:32

## 2019-08-26 RX ADMIN — FENTANYL CITRATE 50 MCG: 50 INJECTION, SOLUTION INTRAMUSCULAR; INTRAVENOUS at 11:58

## 2019-08-26 RX ADMIN — LIDOCAINE HYDROCHLORIDE 50 MG: 10 INJECTION, SOLUTION INFILTRATION; PERINEURAL at 11:21

## 2019-08-26 RX ADMIN — SCOPALAMINE 1 PATCH: 1 PATCH, EXTENDED RELEASE TRANSDERMAL at 08:58

## 2019-08-26 RX ADMIN — ATRACURIUM BESYLATE 10 MG: 10 INJECTION, SOLUTION INTRAVENOUS at 13:57

## 2019-08-26 RX ADMIN — DEXAMETHASONE SODIUM PHOSPHATE 4 MG: 10 INJECTION INTRAMUSCULAR; INTRAVENOUS at 14:29

## 2019-08-26 RX ADMIN — GLYCOPYRROLATE 0.1 MG: 0.2 INJECTION, SOLUTION INTRAMUSCULAR; INTRAVENOUS at 11:46

## 2019-08-26 RX ADMIN — FENTANYL CITRATE 50 MCG: 50 INJECTION, SOLUTION INTRAMUSCULAR; INTRAVENOUS at 12:49

## 2019-08-26 RX ADMIN — CEFAZOLIN SODIUM 2 G: 2 INJECTION, SOLUTION INTRAVENOUS at 11:17

## 2019-08-26 RX ADMIN — ATRACURIUM BESYLATE 10 MG: 10 INJECTION, SOLUTION INTRAVENOUS at 12:45

## 2019-08-26 RX ADMIN — ATRACURIUM BESYLATE 10 MG: 10 INJECTION, SOLUTION INTRAVENOUS at 12:01

## 2019-08-26 RX ADMIN — BUPRENORPHINE HYDROCHLORIDE 0.3 MG: 0.32 INJECTION INTRAMUSCULAR; INTRAVENOUS at 14:29

## 2019-08-26 RX ADMIN — FENTANYL CITRATE 50 MCG: 50 INJECTION INTRAMUSCULAR; INTRAVENOUS at 14:55

## 2019-08-26 RX ADMIN — GLYCOPYRROLATE 0.6 MG: 0.2 INJECTION, SOLUTION INTRAMUSCULAR; INTRAVENOUS at 14:15

## 2019-08-26 RX ADMIN — PROPOFOL 25 MCG/KG/MIN: 10 INJECTION, EMULSION INTRAVENOUS at 11:23

## 2019-08-26 NOTE — ANESTHESIA PREPROCEDURE EVALUATION
Anesthesia Evaluation                  Airway   Mallampati: I  TM distance: >3 FB  Neck ROM: full  No difficulty expected  Dental - normal exam     Pulmonary - normal exam   (+) a smoker Former,   Cardiovascular - normal exam    (+) hypertension,       Neuro/Psych  (+) psychiatric history Anxiety,     GI/Hepatic/Renal/Endo    (+)   hyperthyroidism (GRAVES DZ)    Musculoskeletal     Abdominal  - normal exam    Bowel sounds: normal.   Substance History      OB/GYN          Other                        Anesthesia Plan    ASA 2     general     intravenous induction   Anesthetic plan, all risks, benefits, and alternatives have been provided, discussed and informed consent has been obtained with: patient.    Plan discussed with CRNA.

## 2019-08-26 NOTE — ANESTHESIA POSTPROCEDURE EVALUATION
Patient: Lorraine Jorge    Procedure Summary     Date:  08/26/19 Room / Location:   [facility] / [facility]    Anesthesia Start:  1117 Anesthesia Stop:      Procedure:  TOTAL ABDOMINAL HYSTERECTOMY WITH BILATERAL SALPINGECTOMY      ATTEMPTED TOTAL ROBOTIC HYSTERECTOMY WITH BILATERAL SALPINGECTOMY.  (N/A Abdomen) Diagnosis:      Surgeon:  Jeni Floyd DO Provider:  Valeriano Boyd MD    Anesthesia Type:  general ASA Status:  2          Anesthesia Type: general  Last vitals  BP   121/79 (08/26/19 1442)   Temp   99.3 °F (37.4 °C) (08/26/19 1442)   Pulse   88 (08/26/19 1442)   Resp   16 (08/26/19 1442)     SpO2   98 % (08/26/19 1442)     Post Anesthesia Care and Evaluation    Patient location during evaluation: PACU  Patient participation: complete - patient cannot participate  Post-procedure mental status: asleep.  Pain management: adequate  Airway patency: patent  Anesthetic complications: No anesthetic complications  PONV Status: none  Cardiovascular status: acceptable  Respiratory status: acceptable and oral airway  Hydration status: acceptable

## 2019-08-26 NOTE — ANESTHESIA PROCEDURE NOTES
Peripheral Block      Patient reassessed immediately prior to procedure    Patient location during procedure: OR  Reason for block: at surgeon's request and post-op pain management  Performed by  Anesthesiologist: Valeriano Boyd MD  Preanesthetic Checklist  Completed: patient identified, site marked, surgical consent, pre-op evaluation, timeout performed, IV checked, risks and benefits discussed and monitors and equipment checked  Prep:  Pt Position: supine  Sterile barriers:cap, gloves, sterile barriers and mask  Prep: ChloraPrep  Patient monitoring: blood pressure monitoring, continuous pulse oximetry and EKG  Procedure  Sedation:yes  Performed under: general  Guidance:ultrasound guided  Images:still images obtained, printed/placed on chart    Laterality:Bilateral  Block Type:TAP  Injection Technique:single-shot  Needle Type:short-bevel and echogenic  Needle Gauge:20 G  Resistance on Injection: none    Medications Used: buprenorphine (BUPRENEX) injection, 0.3 mg  dexamethasone sodium phosphate injection, 4 mg  bupivacaine PF (MARCAINE) 0.25 % injection, 60 mL  Med admintered at 8/26/2019 2:29 PM      Medications  Comment:Block Injection:  LA dose divided between Right and Left block        Post Assessment  Injection Assessment: negative aspiration for heme, incremental injection and no paresthesia on injection  Patient Tolerance:comfortable throughout block  Complications:no  Additional Notes      Under Ultrasound guidance, a BBraun 4inch 360 degree needle was advanced with Normal Saline hydro dissection of tissue.  The Internal Oblique and Transversus Abdominus muscles where visualized.  At or before the aponeurosis of Internal Oblique, local anesthetic spread was visualized in the Transversus Abdominus Plane. Injection was made incrementally with aspiration every 5 mls.  There was no  intravascular injection,  injection pressure was normal, there was no neural injection, and the procedure was completed without  difficulty.  Thank You.

## 2019-08-26 NOTE — ANESTHESIA PROCEDURE NOTES
Airway  Urgency: elective    Airway not difficult    General Information and Staff    Patient location during procedure: OR  CRNA: Nichole Montoya CRNA    Indications and Patient Condition  Indications for airway management: airway protection    Preoxygenated: yes  MILS not maintained throughout  Mask difficulty assessment: 1 - vent by mask    Final Airway Details  Final airway type: endotracheal airway      Successful airway: ETT  Cuffed: yes   Successful intubation technique: direct laryngoscopy  Facilitating devices/methods: intubating stylet and cricoid pressure  Endotracheal tube insertion site: oral  Blade: Billie  Blade size: 3  ETT size (mm): 7.0  Cormack-Lehane Classification: grade IIa - partial view of glottis  Placement verified by: chest auscultation and capnometry   Measured from: lips  ETT to lips (cm): 20  Number of attempts at approach: 1    Additional Comments  Negative epigastric sounds, Breath sound equal bilaterally with symmetric chest rise and fall

## 2019-08-27 VITALS
HEIGHT: 67 IN | RESPIRATION RATE: 16 BRPM | HEART RATE: 63 BPM | TEMPERATURE: 99 F | BODY MASS INDEX: 30.1 KG/M2 | SYSTOLIC BLOOD PRESSURE: 126 MMHG | DIASTOLIC BLOOD PRESSURE: 76 MMHG | OXYGEN SATURATION: 98 % | WEIGHT: 191.8 LBS

## 2019-08-27 PROBLEM — D25.9 FIBROID UTERUS: Status: RESOLVED | Noted: 2019-08-26 | Resolved: 2019-08-27

## 2019-08-27 LAB
CYTO UR: NORMAL
DEPRECATED RDW RBC AUTO: 47.9 FL (ref 37–54)
ERYTHROCYTE [DISTWIDTH] IN BLOOD BY AUTOMATED COUNT: 14.2 % (ref 12.3–15.4)
HCT VFR BLD AUTO: 32.6 % (ref 34–46.6)
HGB BLD-MCNC: 9.9 G/DL (ref 12–15.9)
LAB AP CASE REPORT: NORMAL
LAB AP CLINICAL INFORMATION: NORMAL
MCH RBC QN AUTO: 28 PG (ref 26.6–33)
MCHC RBC AUTO-ENTMCNC: 30.4 G/DL (ref 31.5–35.7)
MCV RBC AUTO: 92.4 FL (ref 79–97)
PATH REPORT.FINAL DX SPEC: NORMAL
PATH REPORT.GROSS SPEC: NORMAL
PLATELET # BLD AUTO: 246 10*3/MM3 (ref 140–450)
PMV BLD AUTO: 10.1 FL (ref 6–12)
RBC # BLD AUTO: 3.53 10*6/MM3 (ref 3.77–5.28)
WBC NRBC COR # BLD: 13.09 10*3/MM3 (ref 3.4–10.8)

## 2019-08-27 PROCEDURE — 85027 COMPLETE CBC AUTOMATED: CPT | Performed by: OBSTETRICS & GYNECOLOGY

## 2019-08-27 RX ORDER — IBUPROFEN 800 MG/1
800 TABLET ORAL EVERY 6 HOURS PRN
Qty: 30 TABLET | Refills: 1 | Status: SHIPPED | OUTPATIENT
Start: 2019-08-27 | End: 2019-09-26

## 2019-08-27 RX ORDER — HYDROCODONE BITARTRATE AND ACETAMINOPHEN 5; 325 MG/1; MG/1
1 TABLET ORAL EVERY 6 HOURS PRN
Qty: 30 TABLET | Refills: 0 | Status: SHIPPED | OUTPATIENT
Start: 2019-08-27 | End: 2019-09-06

## 2019-08-27 RX ADMIN — PANTOPRAZOLE SODIUM 40 MG: 40 TABLET, DELAYED RELEASE ORAL at 05:52

## 2019-08-27 RX ADMIN — LISINOPRIL: 10 TABLET ORAL at 09:37

## 2019-11-26 ENCOUNTER — CONSULT (OUTPATIENT)
Dept: ONCOLOGY | Facility: CLINIC | Age: 44
End: 2019-11-26

## 2019-11-26 VITALS
BODY MASS INDEX: 29.66 KG/M2 | TEMPERATURE: 98.2 F | DIASTOLIC BLOOD PRESSURE: 82 MMHG | HEART RATE: 79 BPM | HEIGHT: 67 IN | WEIGHT: 189 LBS | SYSTOLIC BLOOD PRESSURE: 133 MMHG | RESPIRATION RATE: 16 BRPM

## 2019-11-26 DIAGNOSIS — D50.9 IRON DEFICIENCY ANEMIA, UNSPECIFIED IRON DEFICIENCY ANEMIA TYPE: Chronic | ICD-10-CM

## 2019-11-26 DIAGNOSIS — D50.9 IRON DEFICIENCY ANEMIA, UNSPECIFIED IRON DEFICIENCY ANEMIA TYPE: Primary | Chronic | ICD-10-CM

## 2019-11-26 PROCEDURE — 99203 OFFICE O/P NEW LOW 30 MIN: CPT | Performed by: INTERNAL MEDICINE

## 2019-11-26 RX ORDER — METHIMAZOLE 5 MG/1
TABLET ORAL
COMMUNITY

## 2019-11-26 NOTE — PROGRESS NOTES
CHIEF COMPLAINT: Iron deficiency anemia    REASON FOR REFERRAL: Same      RECORDS OBTAINED  Records of the patients history including those obtained from primary care were reviewed and summarized in detail.    Oncology/Hematology History    1. Iron deficiency anemia  2. History of menometrorrhagia with uterine fibroids status post SIENNA/BSO  3. Graves' disease due for thyroidectomy      2/22/2018 hemoglobin 11.8   12/20/2018 hemoglobin 9.2 MCV 79.7 otherwise unremarkable CBC.  Iron low at 34 with saturation 8%, ferritin 5  1/21/2019 saw GI nurse practitioner.  Had reflux symptoms for years.  EGD reportedly in 2010.  Recommended EGD and colonoscopy.  2/4/2019 hemoglobin 9.2  3/5/2019 hemoglobin 10.9 ferritin 13, unremarkable CMP.  Treated with ferrous gluconate 325 mg daily  4/8/2019 hemoglobin 10.8 ferritin 13  10/7/2019 hemoglobin 10.6 iron 49 with normal total iron-binding capacity 354 and saturation 14%, ferritin 42, B12 743 and folate normal 11.6  11/7/2019 hemoglobin 10.7 with MCV of 88  8/26/2019 SIENNA/BSO due to menometrorrhagia and uterine fibroids with anemia due to this presumably    -11/26/2019 initial [facility] hematology consultation: I, Jerardo Hernadez, saw her and recommended continuing her ferrous gluconate which she has been on for the last few months and her MCV is rising suggesting some response.  She has been taking over-the-counter Pepcid however and we will improve her absorption by adding vitamin C which is acid which is necessary for the iron to be absorbable.  She will do 2 of the ferrous gluconate 325 mg on odd numbered days of the month and we will check her iron indices now along with CBC as well as prior to return in 3 to 4 months.  In the meantime, I asked her to follow-up with Dr. Rivas Weaver who she had seen in the past but not for endoscopy and I asked her to get with him to have a EGD and colonoscopy which her  thinks was done a few years ago but the patient is adamant she has not had  that done.  She does not recall having seen the GI doctors in [place] in January that recommended the EGD and colonoscopy.  Assuming that her GI work-up is negative and her hemoglobin is rising on the oral iron and her iron is replete and, she does not need to see hematologist on an ongoing basis to watch her blood counts and, once her iron is repleted, given that she has had a SIENNA/BSO and presumably a subsequent negative GI work-up she should not need lifetime oral iron.        Iron deficiency anemia       HISTORY OF PRESENT ILLNESS:  The patient is a 44 y.o.  female, referred for deficiency anemia.  See above for hematology history of present illness    REVIEW OF SYSTEMS:  A 14 point review of systems was performed and is negative except as noted above.    Past Medical History:   Diagnosis Date   • Anemia    • Fibroid, uterine    • Graves disease    • Hypertension    • Irregular heart beat     Saw [facility] in Feb. 2019 and found it was related to Graves Dz     Past Surgical History:   Procedure Laterality Date   • BREAST SURGERY  2009    Biopy   • CHOLECYSTECTOMY  2011   • TOTAL ABDOMINAL HYSTERECTOMY N/A 8/26/2019    Procedure: TOTAL ABDOMINAL HYSTERECTOMY WITH BILATERAL SALPINGECTOMY, ATTEMPTED WITH FAISAL ;  Surgeon: Jeni Floyd DO;  Location: [facility];  Service: Faisal       Current Outpatient Medications on File Prior to Visit   Medication Sig Dispense Refill   • ALPRAZolam (XANAX) 0.25 MG tablet Take 0.25 mg by mouth 3 (Three) Times a Day As Needed for Anxiety.     • CBD (cannabidiol) oral oil Take  by mouth Every Morning.     • Cholecalciferol (VITAMIN D3) 2000 units capsule Take 1 dose by mouth Every Morning.     • ferrous gluconate (FERGON) 324 MG tablet Take 324 mg by mouth Daily With Breakfast.     • lisinopril-hydrochlorothiazide (PRINZIDE,ZESTORETIC) 20-25 MG per tablet Take 1 tablet by mouth Every Morning.     • methIMAzole (TAPAZOLE) 5 MG tablet methimazole 5 mg tablet   Take 1  "tablet every day by oral route.     • [DISCONTINUED] LANSOPRAZOLE PO Take 15 mg by mouth Every Morning.     • [DISCONTINUED] methIMAzole (TAPAZOLE) 10 MG tablet Take 5 mg by mouth Take As Directed. Monday thru Friday, 5 mg.   Skip Sat. And Sun.       No current facility-administered medications on file prior to visit.        Allergies   Allergen Reactions   • Morphine Hives, Shortness Of Breath, Itching and Swelling   • Other Hives, Shortness Of Breath, Itching and Swelling     Septocaine injection.  Has Epinephrine.     Dental surgery use   • Latex Rash     Issue with Condoms.          Social History     Socioeconomic History   • Marital status:      Spouse name: Not on file   • Number of children: Not on file   • Years of education: Not on file   • Highest education level: Not on file   Tobacco Use   • Smoking status: Former Smoker     Packs/day: 3.00     Years: 20.00     Pack years: 60.00     Types: Cigarettes     Last attempt to quit:      Years since quittin.9   • Smokeless tobacco: Never Used   Substance and Sexual Activity   • Alcohol use: No     Frequency: Never   • Drug use: No   • Sexual activity: Defer       History reviewed. No pertinent family history.    PHYSICAL EXAM:    /82   Pulse 79   Temp 98.2 °F (36.8 °C)   Resp 16   Ht 170.2 cm (67\")   Wt 85.7 kg (189 lb)   BMI 29.60 kg/m²     ECOG: (0) Fully active, able to carry on all predisease performance without restriction  General: well appearing female in no acute distress  HEENT: sclera anicteric, oropharynx clear  Lymphatics: no cervical, supraclavicular, inguinal, or axillary adenopathy  Cardiovascular: regular rate and rhythm, no murmurs  Neck: Supple; No thyromegaly  Lungs: clear to auscultation bilaterally. No respiratory distress.   Abdomen: soft, nontender, nondistended.  No palpable organomegaly  Extremities: no cyanosis, clubbing, edema, or cords  Skin: no rashes, lesions, bruising, or petechiae  Neuro: Alert and " oriented x 4; Moving all extremities.  Psych: No anxiety or depression    Lab Results   Component Value Date    HGB 9.9 (L) 08/27/2019    HCT 32.6 (L) 08/27/2019    MCV 92.4 08/27/2019     08/27/2019    WBC 13.09 (H) 08/27/2019    NEUTROABS 3.49 08/15/2019    LYMPHSABS 1.47 08/15/2019    MONOSABS 0.50 08/15/2019    EOSABS 0.07 08/15/2019    BASOSABS 0.02 08/15/2019     Lab Results   Component Value Date    K 4.2 08/26/2019    PROTEINTOT 7.0 08/15/2019    ALBUMIN 4.2 08/15/2019    BILITOT 0.3 08/15/2019    ALKPHOS 87 08/15/2019    AST 21 08/15/2019    ALT 20 08/15/2019           Assessment/Plan     1. Iron deficiency anemia  2. History of menometrorrhagia with uterine fibroids status post SIENNA/BSO  3. Graves' disease due for thyroidectomy      2/22/2018 hemoglobin 11.8   12/20/2018 hemoglobin 9.2 MCV 79.7 otherwise unremarkable CBC.  Iron low at 34 with saturation 8%, ferritin 5  1/21/2019 saw GI nurse practitioner.  Had reflux symptoms for years.  EGD reportedly in 2010.  Recommended EGD and colonoscopy.  2/4/2019 hemoglobin 9.2  3/5/2019 hemoglobin 10.9 ferritin 13, unremarkable CMP.  Treated with ferrous gluconate 325 mg daily  4/8/2019 hemoglobin 10.8 ferritin 13  10/7/2019 hemoglobin 10.6 iron 49 with normal total iron-binding capacity 354 and saturation 14%, ferritin 42, B12 743 and folate normal 11.6  11/7/2019 hemoglobin 10.7 with MCV of 88  8/26/2019 SIENNA/BSO due to menometrorrhagia and uterine fibroids with anemia due to this presumably    -11/26/2019 initial [facility] hematology consultation: I, Jerardo Hernadez, saw her and recommended continuing her ferrous gluconate which she has been on for the last few months and her MCV is rising suggesting some response.  She has been taking over-the-counter Pepcid however and we will improve her absorption by adding vitamin C which is acid which is necessary for the iron to be absorbable.  She will do 2 of the ferrous gluconate 325 mg on odd numbered days of the month  and we will check her iron indices now along with CBC as well as prior to return in 3 to 4 months.  In the meantime, I asked her to follow-up with Dr. Rivas Weaver who she had seen in the past but not for endoscopy and I asked her to get with him to have a EGD and colonoscopy which her  thinks was done a few years ago but the patient is adamant she has not had that done.  She does not recall having seen the GI doctors in [place] in January that recommended the EGD and colonoscopy.  Assuming that her GI work-up is negative and her hemoglobin is rising on the oral iron and her iron is replete and, she does not need to see hematologist on an ongoing basis to watch her blood counts and, once her iron is repleted, given that she has had a SIENNA/BSO and presumably a subsequent negative GI work-up she should not need lifetime oral iron.    I discussed with patient face-to-face 30 minutes greater than 50% spent counseling regarding this plan as outlined above.      Jerardo Hernadez MD    11/26/2019

## 2019-11-27 LAB
BASOPHILS # BLD AUTO: 0 X10E3/UL (ref 0–0.2)
BASOPHILS NFR BLD AUTO: 0 %
EOSINOPHIL # BLD AUTO: 0.1 X10E3/UL (ref 0–0.4)
EOSINOPHIL NFR BLD AUTO: 1 %
ERYTHROCYTE [DISTWIDTH] IN BLOOD BY AUTOMATED COUNT: 14.5 % (ref 12.3–15.4)
FERRITIN SERPL-MCNC: 68 NG/ML (ref 15–150)
HCT VFR BLD AUTO: 33 % (ref 34–46.6)
HGB BLD-MCNC: 10.9 G/DL (ref 11.1–15.9)
IMM GRANULOCYTES # BLD AUTO: 0 X10E3/UL (ref 0–0.1)
IMM GRANULOCYTES NFR BLD AUTO: 0 %
IRON SATN MFR SERPL: 5 % (ref 15–55)
IRON SERPL-MCNC: 18 UG/DL (ref 27–159)
LYMPHOCYTES # BLD AUTO: 1.5 X10E3/UL (ref 0.7–3.1)
LYMPHOCYTES NFR BLD AUTO: 22 %
MCH RBC QN AUTO: 27.7 PG (ref 26.6–33)
MCHC RBC AUTO-ENTMCNC: 33 G/DL (ref 31.5–35.7)
MCV RBC AUTO: 84 FL (ref 79–97)
MONOCYTES # BLD AUTO: 0.5 X10E3/UL (ref 0.1–0.9)
MONOCYTES NFR BLD AUTO: 7 %
NEUTROPHILS # BLD AUTO: 4.9 X10E3/UL (ref 1.4–7)
NEUTROPHILS NFR BLD AUTO: 70 %
PLATELET # BLD AUTO: 291 X10E3/UL (ref 150–450)
RBC # BLD AUTO: 3.94 X10E6/UL (ref 3.77–5.28)
TIBC SERPL-MCNC: 337 UG/DL (ref 250–450)
UIBC SERPL-MCNC: 319 UG/DL (ref 131–425)
WBC # BLD AUTO: 7 X10E3/UL (ref 3.4–10.8)

## 2020-09-22 ENCOUNTER — OFFICE VISIT (OUTPATIENT)
Dept: ENDOCRINOLOGY | Facility: CLINIC | Age: 45
End: 2020-09-22

## 2020-09-22 VITALS
BODY MASS INDEX: 29.83 KG/M2 | OXYGEN SATURATION: 99 % | WEIGHT: 185.6 LBS | SYSTOLIC BLOOD PRESSURE: 110 MMHG | HEART RATE: 98 BPM | HEIGHT: 66 IN | DIASTOLIC BLOOD PRESSURE: 77 MMHG

## 2020-09-22 DIAGNOSIS — E05.90 HYPERTHYROIDISM: Primary | ICD-10-CM

## 2020-09-22 DIAGNOSIS — R73.03 PREDIABETES: ICD-10-CM

## 2020-09-22 PROCEDURE — 99244 OFF/OP CNSLTJ NEW/EST MOD 40: CPT | Performed by: INTERNAL MEDICINE

## 2020-09-22 RX ORDER — LANSOPRAZOLE 15 MG/1
15 CAPSULE, DELAYED RELEASE ORAL DAILY
COMMUNITY
Start: 2020-08-26

## 2020-09-24 NOTE — PROGRESS NOTES
Graves' Disease (Consult )    Subjective    Lorraine Jorge is a 45 y.o. female. she is being seen for consultation today at the request of  Robbie Hernandez,* for evaluation of   Hyperthyroidism dx in 2017  Patient presented with sx of elevated blood pressure and palpitations. She wa started on methimazole and the dose was slowly reduced to a current dose of 5 mg 5 days a week. Patient have seen Dr Wyatt Major in [facility] in [place]. Records requested.   Last thyroid tests showed TSH was 2.52 in 08/21/2020.   A1C was borderline at 6.1. She started following the diet consistently.       History of Present Illness    Review of Systems  Review of Systems   HENT: Positive for voice change.    Eyes: Positive for pain (pressure behind the eye), redness and itching.   Respiratory: Positive for shortness of breath.    Cardiovascular: Positive for palpitations (occasional).   Musculoskeletal: Positive for arthralgias, joint swelling and myalgias.   Skin:        itching   Psychiatric/Behavioral: The patient is nervous/anxious.    All other systems reviewed and are negative.    Current medications:  Current Outpatient Medications   Medication Sig Dispense Refill   • ALPRAZolam (XANAX) 0.25 MG tablet Take 0.25 mg by mouth 3 (Three) Times a Day As Needed for Anxiety.     • Cholecalciferol (VITAMIN D3) 2000 units capsule Take 1 dose by mouth Every Morning.     • ferrous gluconate (FERGON) 324 MG tablet Take 324 mg by mouth Daily With Breakfast.     • lansoprazole (PREVACID) 15 MG capsule Take 15 mg by mouth Daily.     • lisinopril-hydrochlorothiazide (PRINZIDE,ZESTORETIC) 20-25 MG per tablet Take 1 tablet by mouth Every Morning.     • methIMAzole (TAPAZOLE) 5 MG tablet methimazole 5 mg tablet   Take 1 tablet every day by oral route.       No current facility-administered medications for this visit.        The following portions of the patient's history were reviewed and updated as appropriate: She  has a past  "medical history of Anemia, Fibroid, uterine, Graves disease, Hypertension, and Irregular heart beat.  She  has a past surgical history that includes Breast surgery (2009); Cholecystectomy (2011); and Total abdominal hysterectomy (N/A, 8/26/2019).  Her family history is not on file.  She  reports that she quit smoking about 14 years ago. Her smoking use included cigarettes. She has a 60.00 pack-year smoking history. She has never used smokeless tobacco. She reports that she does not drink alcohol or use drugs.  She is allergic to morphine; other; latex; bactrim [sulfamethoxazole-trimethoprim]; and nitrofurantoin..        Objective      Vitals:    09/22/20 1302   BP: 110/77   Pulse: 98   SpO2: 99%   Weight: 84.2 kg (185 lb 9.6 oz)   Height: 167.6 cm (66\")   Body mass index is 29.96 kg/m².  Physical Exam  Vitals signs reviewed.   Constitutional:       General: She is not in acute distress.     Appearance: Normal appearance. She is well-developed. She is obese.   HENT:      Head: Normocephalic and atraumatic.   Eyes:      Conjunctiva/sclera: Conjunctivae normal.   Neck:      Musculoskeletal: Normal range of motion. No muscular tenderness.      Thyroid: No thyroid mass, thyromegaly or thyroid tenderness.      Comments: The neck is supple and no assimetry visualized  Cardiovascular:      Rate and Rhythm: Normal rate and regular rhythm.      Pulses: Normal pulses.      Heart sounds: Normal heart sounds.   Pulmonary:      Effort: Pulmonary effort is normal.      Breath sounds: Normal breath sounds.   Musculoskeletal:         General: No swelling or deformity.   Lymphadenopathy:      Cervical: No cervical adenopathy.   Skin:     General: Skin is warm and dry.      Findings: No rash.   Neurological:      General: No focal deficit present.      Mental Status: She is alert and oriented to person, place, and time.   Psychiatric:         Mood and Affect: Mood normal.         Behavior: Behavior normal.         Thought Content: " Thought content normal.         LABS AND IMAGING  No visits with results within 1 Month(s) from this visit.   Latest known visit with results is:   Orders Only on 11/26/2019   Component Date Value Ref Range Status   • WBC 11/26/2019 7.0  3.4 - 10.8 x10E3/uL Final   • RBC 11/26/2019 3.94  3.77 - 5.28 x10E6/uL Final   • Hemoglobin 11/26/2019 10.9* 11.1 - 15.9 g/dL Final   • Hematocrit 11/26/2019 33.0* 34.0 - 46.6 % Final   • MCV 11/26/2019 84  79 - 97 fL Final   • MCH 11/26/2019 27.7  26.6 - 33.0 pg Final   • MCHC 11/26/2019 33.0  31.5 - 35.7 g/dL Final   • RDW 11/26/2019 14.5  12.3 - 15.4 % Final   • Platelets 11/26/2019 291  150 - 450 x10E3/uL Final   • Neutrophil Rel % 11/26/2019 70  Not Estab. % Final   • Lymphocyte Rel % 11/26/2019 22  Not Estab. % Final   • Monocyte Rel % 11/26/2019 7  Not Estab. % Final   • Eosinophil Rel % 11/26/2019 1  Not Estab. % Final   • Basophil Rel % 11/26/2019 0  Not Estab. % Final   • Neutrophils Absolute 11/26/2019 4.9  1.4 - 7.0 x10E3/uL Final   • Lymphocytes Absolute 11/26/2019 1.5  0.7 - 3.1 x10E3/uL Final   • Monocytes Absolute 11/26/2019 0.5  0.1 - 0.9 x10E3/uL Final   • Eosinophils Absolute 11/26/2019 0.1  0.0 - 0.4 x10E3/uL Final   • Basophils Absolute 11/26/2019 0.0  0.0 - 0.2 x10E3/uL Final   • Immature Granulocyte Rel % 11/26/2019 0  Not Estab. % Final   • Immature Grans Absolute 11/26/2019 0.0  0.0 - 0.1 x10E3/uL Final   • TIBC 11/26/2019 337  250 - 450 ug/dL Final   • UIBC 11/26/2019 319  131 - 425 ug/dL Final   • Iron 11/26/2019 18* 27 - 159 ug/dL Final   • Iron Saturation 11/26/2019 5* 15 - 55 % Final   • Ferritin 11/26/2019 68  15 - 150 ng/mL Final       Assessment/Plan      Problem List Items Addressed This Visit     None      Visit Diagnoses     Hyperthyroidism    -  Primary    Relevant Orders    T4, Free    TSH    Prediabetes        Relevant Orders    Hemoglobin A1c              PLAN  1.  Hyperthyroidism - likely secondary to Graves' disease.   -cont methimazole 5  mg Mon-Fri and repeat TFT for dose adjustments    2.  Prediabetes - We have discussed the goals of HgbA1C, consistent carbohydrate diet and other lifestyle modifications. The literature with the examples of meal plan was provided to patient, as well as healthy snack suggestions.The healthy eating instructions and tips on weight loss were discussed and all questions were answered. Repeat A1C and if its not improving start metformin     3. Old records were reviewed and summarized in HPI section of the note.    Return in about 6 months (around 3/22/2021) for ultrasound.

## 2022-03-09 ENCOUNTER — APPOINTMENT (OUTPATIENT)
Dept: WOMENS IMAGING | Facility: HOSPITAL | Age: 47
End: 2022-03-09

## 2022-03-09 PROCEDURE — 77067 SCR MAMMO BI INCL CAD: CPT | Performed by: RADIOLOGY

## 2022-04-20 ENCOUNTER — OFFICE (OUTPATIENT)
Dept: URBAN - METROPOLITAN AREA CLINIC 4 | Facility: CLINIC | Age: 47
End: 2022-04-20

## 2022-04-20 VITALS — DIASTOLIC BLOOD PRESSURE: 85 MMHG | SYSTOLIC BLOOD PRESSURE: 163 MMHG | HEIGHT: 67 IN | WEIGHT: 191 LBS

## 2022-04-20 DIAGNOSIS — R14.0 ABDOMINAL DISTENSION (GASEOUS): ICD-10-CM

## 2022-04-20 DIAGNOSIS — R10.11 RIGHT UPPER QUADRANT PAIN: ICD-10-CM

## 2022-04-20 PROCEDURE — 99204 OFFICE O/P NEW MOD 45 MIN: CPT | Performed by: NURSE PRACTITIONER

## 2022-04-20 RX ORDER — DICYCLOMINE HYDROCHLORIDE 10 MG/1
40 CAPSULE ORAL
Qty: 60 | Refills: 5 | Status: ACTIVE
Start: 2022-04-20

## 2022-07-07 ENCOUNTER — TRANSCRIBE ORDERS (OUTPATIENT)
Dept: MAMMOGRAPHY | Facility: CLINIC | Age: 47
End: 2022-07-07

## 2022-07-07 DIAGNOSIS — R07.89 CHEST WALL PAIN: Primary | ICD-10-CM

## 2024-09-24 ENCOUNTER — OFFICE VISIT (OUTPATIENT)
Dept: FAMILY MEDICINE CLINIC | Facility: CLINIC | Age: 49
End: 2024-09-24
Payer: COMMERCIAL

## 2024-09-24 VITALS
DIASTOLIC BLOOD PRESSURE: 88 MMHG | WEIGHT: 185 LBS | BODY MASS INDEX: 29.73 KG/M2 | HEIGHT: 66 IN | HEART RATE: 85 BPM | OXYGEN SATURATION: 97 % | SYSTOLIC BLOOD PRESSURE: 138 MMHG

## 2024-09-24 DIAGNOSIS — D50.9 IRON DEFICIENCY ANEMIA, UNSPECIFIED IRON DEFICIENCY ANEMIA TYPE: Primary | Chronic | ICD-10-CM

## 2024-09-24 DIAGNOSIS — I10 PRIMARY HYPERTENSION: ICD-10-CM

## 2024-09-24 DIAGNOSIS — E55.9 VITAMIN D DEFICIENCY: ICD-10-CM

## 2024-09-24 DIAGNOSIS — K21.9 GASTROESOPHAGEAL REFLUX DISEASE WITHOUT ESOPHAGITIS: ICD-10-CM

## 2024-09-24 DIAGNOSIS — Z11.59 NEED FOR HEPATITIS C SCREENING TEST: ICD-10-CM

## 2024-09-24 DIAGNOSIS — Z12.11 SCREENING FOR COLON CANCER: ICD-10-CM

## 2024-09-24 PROCEDURE — 99204 OFFICE O/P NEW MOD 45 MIN: CPT | Performed by: STUDENT IN AN ORGANIZED HEALTH CARE EDUCATION/TRAINING PROGRAM

## 2024-09-24 RX ORDER — METOPROLOL SUCCINATE 25 MG/1
25 TABLET, EXTENDED RELEASE ORAL DAILY
COMMUNITY

## 2024-09-24 NOTE — PROGRESS NOTES
Office Note     Name: Lorraine Jorge    : 1975     MRN: [id number]     Chief Complaint  Establish Care    Subjective     History of Present Illness:  Lorraine Jorge is a 49 y.o. female who presents today to establish care.    HTN  -compliant with medication  -runs 130/80 at home when she checks, sometimes lower    GERD  -prevacid controls symptoms well for this, does it OTC    Vitamin D deficiency  -taking supplement daily     Iron deficiency  -no longer taking iron, secondary to heavy vaginal bleeding     Herniated disc  -spine Dr. Harris manages gabapentin    Anxiety  -pt reports taking xanax in the past, no recent fills    -needs colonoscopy   -[facility]     Past Medical History:   Past Medical History:   Diagnosis Date    Anemia     Fibroid, uterine     Graves disease     Hypertension     Irregular heart beat     Saw [facility] in 2019 and found it was related to Graves Dz       Past Surgical History:   Past Surgical History:   Procedure Laterality Date    BREAST SURGERY      Biopy    CHOLECYSTECTOMY      LIVER SURGERY      had surgery to correct tear on liver during dylan    TOTAL ABDOMINAL HYSTERECTOMY N/A 2019    Procedure: TOTAL ABDOMINAL HYSTERECTOMY WITH BILATERAL SALPINGECTOMY, ATTEMPTED WITH SACHIN ;  Surgeon: Jeni Floyd DO;  Location: [facility];  Service: Sachin       Immunizations:   Immunization History   Administered Date(s) Administered    DTP 1973, 1973, 1976, 1979    Measles / Rubella 1980    Polio, Unspecified 1973, 1973, 1976, 1979    Td, Not Adsorbed 1989    Tdap 2010        Medications:     Current Outpatient Medications:     Cholecalciferol (VITAMIN D3) 2000 units capsule, Take 1 dose by mouth Every Morning., Disp: , Rfl:     lansoprazole (PREVACID) 15 MG capsule, Take 1 capsule by mouth Daily., Disp: , Rfl:     lisinopril-hydrochlorothiazide  "(PRINZIDE,ZESTORETIC) 20-25 MG per tablet, Take 1 tablet by mouth Every Morning., Disp: , Rfl:     methIMAzole (TAPAZOLE) 10 MG tablet, Take 1 tablet by mouth. 5x a week, Disp: , Rfl:     ALPRAZolam (XANAX) 0.25 MG tablet, Take 0.25 mg by mouth 3 (Three) Times a Day As Needed for Anxiety., Disp: , Rfl:     metoprolol succinate XL (TOPROL-XL) 25 MG 24 hr tablet, Take 1 tablet by mouth Daily., Disp: , Rfl:     Allergies:   Allergies   Allergen Reactions    Morphine Hives, Shortness Of Breath, Itching and Swelling    Other Hives, Shortness Of Breath, Itching and Swelling     Septocaine injection.  Has Epinephrine.     Dental surgery use    Latex Rash     Issue with Condoms.       Bactrim [Sulfamethoxazole-Trimethoprim] Swelling    Nitrofurantoin Swelling       Family History:   Family History   Problem Relation Age of Onset    Hypertension Father     Diabetes Sister     Diabetes Maternal Aunt     Cancer Maternal Aunt        Social History:   Social History     Socioeconomic History    Marital status:    Tobacco Use    Smoking status: Former     Current packs/day: 0.00     Average packs/day: 3.0 packs/day for 20.0 years (60.0 ttl pk-yrs)     Types: Cigarettes     Start date:      Quit date:      Years since quittin.7    Smokeless tobacco: Never   Substance and Sexual Activity    Alcohol use: No    Drug use: No    Sexual activity: Defer         Objective     Vital Signs  /88 (BP Location: Left arm, Patient Position: Sitting, Cuff Size: Large Adult)   Pulse 85   Ht 167.6 cm (66\")   Wt 83.9 kg (185 lb)   SpO2 97%   BMI 29.86 kg/m²   Estimated body mass index is 29.86 kg/m² as calculated from the following:    Height as of this encounter: 167.6 cm (66\").    Weight as of this encounter: 83.9 kg (185 lb).        Physical Exam  Constitutional:       General: She is not in acute distress.     Appearance: Normal appearance.   HENT:      Head: Normocephalic and atraumatic.   Eyes:      " Conjunctiva/sclera: Conjunctivae normal.   Cardiovascular:      Rate and Rhythm: Normal rate and regular rhythm.   Pulmonary:      Effort: Pulmonary effort is normal.      Breath sounds: Normal breath sounds.   Neurological:      Mental Status: She is alert.   Psychiatric:         Mood and Affect: Mood normal.         Behavior: Behavior normal.         Thought Content: Thought content normal.          Assessment and Plan     Diagnoses and all orders for this visit:    1. Iron deficiency anemia, unspecified iron deficiency anemia type (Primary)  -     CBC & Differential; Future  -     Iron Profile; Future    2. Vitamin D deficiency  -     Vitamin D,25-Hydroxy; Future    3. Primary hypertension  -     Comprehensive Metabolic Panel; Future  -     Lipid Panel; Future  -     Hemoglobin A1c; Future    4. Need for hepatitis C screening test  -     Hepatitis C Antibody; Future    5. Screening for colon cancer  -     Ambulatory Referral For Screening Colonoscopy    6. Gastroesophageal reflux disease without esophagitis      Patient is here today to establish care.  We discussed her chronic medical conditions.  Does have a history of iron deficiency anemia as we will repeat a CBC and iron profile.  Also with a history of vitamin D deficiency and we will recheck vitamin D level.  Blood pressure is at goal of less than 140/90 we will continue current regimen.  Will get a CMP today.  Patient is due for colon cancer screening and is agreeable to referral for colonoscopy.  She has completed her mammogram and we will request copies of these records.  Is not yet due for her physical as she had this done at her last PCP.  We will get the records from that appointment.       Follow Up  No follow-ups on file.    DO [facility] PR[facility]  [address] [place] [id number] [phone]    NOTE TO PATIENT: The 21st Century Cures Act makes medical notes like these available to  patients in the interest of transparency. However, be advised this is a medical document. It is intended as peer to peer communication. It is written in medical language and may contain abbreviations or verbiage that are unfamiliar. It may appear blunt or direct. Medical documents are intended to carry relevant information, facts as evident, and the clinical opinion of the practitioner.

## 2025-01-17 ENCOUNTER — OFFICE VISIT (OUTPATIENT)
Dept: GASTROENTEROLOGY | Facility: CLINIC | Age: 50
End: 2025-01-17
Payer: COMMERCIAL

## 2025-01-17 VITALS
HEIGHT: 66 IN | HEART RATE: 87 BPM | BODY MASS INDEX: 30.7 KG/M2 | TEMPERATURE: 96.8 F | DIASTOLIC BLOOD PRESSURE: 88 MMHG | SYSTOLIC BLOOD PRESSURE: 128 MMHG | OXYGEN SATURATION: 99 % | RESPIRATION RATE: 24 BRPM | WEIGHT: 191 LBS

## 2025-01-17 DIAGNOSIS — Z83.719 FAMILY HISTORY OF POLYPS IN THE COLON: ICD-10-CM

## 2025-01-17 DIAGNOSIS — Z12.11 ENCOUNTER FOR SCREENING FOR MALIGNANT NEOPLASM OF COLON: Primary | ICD-10-CM

## 2025-01-17 NOTE — PROGRESS NOTES
Patient Name: Lorraine Jorge   YOB: 1975   Medical Record number: [id number]     PCP: Anupam Jorge MD    Chief Complaint   Patient presents with    New patient     Hemorrhoids       History of Present Illness:   HPI  Mrs. Jorge presents for office consultation.  The patient is here to discuss colonoscopy.  She has a family history of colon polyps in her sister.  There is no family history of colon cancer.  Mrs. Jorge denies any family history of ovarian or uterine cancer.  There is no family history of inflammatory bowel disease.  The patient has regular bowel habits overall but needs some MiraLAX that time to continue regular bowel function.  There is no history of gross blood in the stool.  She does have a history of hemorrhoid issues with prolapse.  The patient denies any medication for hemorrhoids.  Mrs. Jorge denies any localized abdominal pain is no history of unexplained weight loss.  She denies any breakthrough heartburn.  There is a personal history of thyroid disease.  She is scheduled to see an endocrinologist at the [facility].  Past Medical History:   Diagnosis Date    Anemia     Fibroid, uterine     Graves disease     Hypertension     Irregular heart beat     Saw [facility] in Feb. 2019 and found it was related to Graves Dz       Past Surgical History:   Procedure Laterality Date    BREAST SURGERY  2009    Biopy    CHOLECYSTECTOMY  2011    LIVER SURGERY  2014    had surgery to correct tear on liver during dylan    TOTAL ABDOMINAL HYSTERECTOMY N/A 08/26/2019    Procedure: TOTAL ABDOMINAL HYSTERECTOMY WITH BILATERAL SALPINGECTOMY, ATTEMPTED WITH SACHIN ;  Surgeon: Jeni Floyd DO;  Location: [facility];  Service: Sachin         Current Outpatient Medications:     ALPRAZolam (XANAX) 0.25 MG tablet, Take 0.25 mg by mouth 3 (Three) Times a Day As Needed for Anxiety., Disp: , Rfl:     Cholecalciferol (VITAMIN D3) 2000 units capsule, Take 1 dose by mouth Every Morning.,  Disp: , Rfl:     lansoprazole (PREVACID) 15 MG capsule, Take 1 capsule by mouth Daily., Disp: , Rfl:     lisinopril-hydrochlorothiazide (PRINZIDE,ZESTORETIC) 20-25 MG per tablet, Take 1 tablet by mouth Every Morning., Disp: , Rfl:     methIMAzole (TAPAZOLE) 10 MG tablet, Take 1 tablet by mouth. 5x a week, Disp: , Rfl:     metoprolol succinate XL (TOPROL-XL) 25 MG 24 hr tablet, Take 1 tablet by mouth Daily., Disp: , Rfl:     Allergies   Allergen Reactions    Morphine Hives, Shortness Of Breath, Itching and Swelling    Other Hives, Shortness Of Breath, Itching and Swelling     Septocaine injection.  Has Epinephrine.     Dental surgery use    Latex Rash     Issue with Condoms.       Bactrim [Sulfamethoxazole-Trimethoprim] Swelling    Nitrofurantoin Swelling       Family History   Problem Relation Age of Onset    Hypertension Father     Diabetes Sister     Diabetes Maternal Aunt     Cancer Maternal Aunt        Social History     Socioeconomic History    Marital status:    Tobacco Use    Smoking status: Former     Current packs/day: 0.00     Average packs/day: 3.0 packs/day for 20.0 years (60.0 ttl pk-yrs)     Types: Cigarettes     Start date:      Quit date:      Years since quittin.0    Smokeless tobacco: Never   Substance and Sexual Activity    Alcohol use: No    Drug use: No    Sexual activity: Defer       Review of Systems   Constitutional:  Negative for appetite change, fatigue, fever and unexpected weight change.   HENT:  Negative for dental problem, mouth sores, postnasal drip, sneezing, trouble swallowing and voice change.    Eyes:  Negative for pain, redness and itching.   Respiratory:  Negative for cough, shortness of breath and wheezing.    Cardiovascular:  Negative for chest pain, palpitations and leg swelling.   Gastrointestinal:  Negative for abdominal distention, abdominal pain, anal bleeding, blood in stool, constipation, diarrhea, nausea, rectal pain and vomiting.   Endocrine:  Negative for cold intolerance, heat intolerance, polydipsia and polyuria.   Genitourinary:  Negative for dysuria, enuresis, flank pain, hematuria and urgency.   Musculoskeletal:  Negative for arthralgias, back pain, joint swelling and myalgias.   Skin:  Negative for color change, pallor and rash.   Allergic/Immunologic: Negative for environmental allergies, food allergies and immunocompromised state.   Neurological:  Negative for dizziness, tremors, seizures, facial asymmetry, numbness and headaches.   Psychiatric/Behavioral:  Negative for behavioral problems, dysphoric mood, hallucinations and self-injury.        Vitals:    01/17/25 0951   BP: 128/88   Pulse: 87   Resp: 24   Temp: 96.8 °F (36 °C)   SpO2: 99%       Physical Exam  Vitals reviewed.   Constitutional:       General: She is not in acute distress.     Appearance: Normal appearance.   HENT:      Head: Normocephalic and atraumatic.      Nose: Nose normal.      Mouth/Throat:      Mouth: Mucous membranes are moist.      Pharynx: Oropharynx is clear. No posterior oropharyngeal erythema.   Eyes:      General: No scleral icterus.     Extraocular Movements: Extraocular movements intact.   Cardiovascular:      Rate and Rhythm: Normal rate and regular rhythm.      Heart sounds: No murmur heard.  Pulmonary:      Breath sounds: Normal breath sounds. No wheezing or rales.   Abdominal:      General: Bowel sounds are normal.      Palpations: Abdomen is soft.      Tenderness: There is no abdominal tenderness. There is no guarding.   Musculoskeletal:         General: No swelling. Normal range of motion.      Cervical back: Normal range of motion and neck supple.   Skin:     General: Skin is dry.      Coloration: Skin is not jaundiced.   Neurological:      Mental Status: She is alert and oriented to person, place, and time.   Psychiatric:         Mood and Affect: Mood normal.         Thought Content: Thought content normal.         Judgment: Judgment normal.          Diagnoses and all orders for this visit:    1. Encounter for screening for malignant neoplasm of colon (Primary)    2. Family history of polyps in the colon    The patient has a family history of colon polyps in a first-degree relative.  I discussed that a Cologuard study is not appropriate and would not be the standard of care in this situation.  The patient is concerned about the deductible cost.      Plan: The office will give the patient the billing office number to discuss what cost there may be for colonoscopy study at [facility].           I discussed the indication for the procedure the potential risks and benefits.

## 2025-06-30 ENCOUNTER — TELEPHONE (OUTPATIENT)
Dept: GASTROENTEROLOGY | Facility: CLINIC | Age: 50
End: 2025-06-30

## 2025-06-30 NOTE — TELEPHONE ENCOUNTER
Caller: PRIMARY CARE    Relationship:     Best call back number: [phone]    What was the call regarding: PT PRIMARY CARE OFFICE CALLED NEEDING TO GET PT AND APPT, PROVIDER SCHEDULE OUT TILL DECEMBER , PT IS DEALING WITH ABDOMINAL PAIN AND DIARRHEA AND IS NEEDING AND SOONER APPT. PLEASE ADVISE.

## 2025-06-30 NOTE — TELEPHONE ENCOUNTER
Dr Bang,     I spoke with Ms Jorge. Patient is experiencing Constant abdominal pain(dull-5) with diarrhea x 3 weeks, low grad fever of 99.0. She has been on two antibiotics. Patient stated you mention a colonoscopy in the past and she was worried about the cost but she said her insurance should cover it. Please advise.

## 2025-07-07 ENCOUNTER — OUTSIDE FACILITY SERVICE (OUTPATIENT)
Dept: GASTROENTEROLOGY | Facility: CLINIC | Age: 50
End: 2025-07-07
Payer: COMMERCIAL

## 2025-07-07 PROCEDURE — 45378 DIAGNOSTIC COLONOSCOPY: CPT | Performed by: INTERNAL MEDICINE

## 2025-07-29 ENCOUNTER — TELEPHONE (OUTPATIENT)
Dept: GASTROENTEROLOGY | Facility: CLINIC | Age: 50
End: 2025-07-29
Payer: COMMERCIAL

## 2025-07-29 NOTE — TELEPHONE ENCOUNTER
Caller: MEGHANN MORGAN    Relationship to patient: SELF     Best call back number: [phone]    Patient is needing: PT HAD A COLONOSCOPY WITH DR. NICOLAS ON 7.07, MODERATE INTERNAL HEMORRHOIDS WERE FOUND. PT WANTS TO KNOW WHO TO FOLLOW UP WITH IN REGARDS TO THE HEMORRHOIDS, DOES SHE NEED A REFERRAL TO A GENERAL SURGEON? THE HEMORRHOIDS ARE CAUSING HER QUITE A BIT OF TROUBLE, AND SHE'D LIKE TO KNOW WHAT HER NEXT STEP IS IN ADDRESSING THEM

## 2025-07-30 NOTE — TELEPHONE ENCOUNTER
Dr Bang,  I spoke with Ms Jorge this morning. Patient stated that she went to the ER last night. They put in a referral for her to see a surgeon in [place]. She will call us back if that doesn't work out.

## (undated) DEVICE — IRRIGATOR BULB ASEPTO 60CC STRL

## (undated) DEVICE — FLTR PLUMEPORT LAP W/CONN STRL

## (undated) DEVICE — SUT PDS 2/0 CT2 27IN VIL PDP333H

## (undated) DEVICE — APPL CHLORAPREP W/TINT 26ML BLU

## (undated) DEVICE — GAUZE,SPONGE,4"X4",16PLY,XRAY,STRL,LF: Brand: MEDLINE

## (undated) DEVICE — SYR TB SFTY 1CC W 27G 1/2IN NDL

## (undated) DEVICE — INTENDED FOR TISSUE SEPARATION, AND OTHER PROCEDURES THAT REQUIRE A SHARP SURGICAL BLADE TO PUNCTURE OR CUT.: Brand: BARD-PARKER ® STAINLESS STEEL BLADES

## (undated) DEVICE — VCARE SMALL UTERINE MANIPULATOR: Brand: VCARE SMALL

## (undated) DEVICE — CANNULA SEAL

## (undated) DEVICE — ENDOPATH XCEL BLADELESS TROCARS WITH STABILITY SLEEVES: Brand: ENDOPATH XCEL

## (undated) DEVICE — OBT BLADLES ENDOWRIST DAVINCI/S 8MM

## (undated) DEVICE — [HIGH FLOW INSUFFLATOR,  DO NOT USE IF PACKAGE IS DAMAGED,  KEEP DRY,  KEEP AWAY FROM SUNLIGHT,  PROTECT FROM HEAT AND RADIOACTIVE SOURCES.]: Brand: PNEUMOSURE

## (undated) DEVICE — SYR LUERLOK 20CC BX/50

## (undated) DEVICE — SCRB SURG BACTOSHIELD CHG 4PCT 4OZ

## (undated) DEVICE — LAPAROSCOPY WOUND CLOSURE SYSTEM KIT CONSISTS OF:05391529640002; NEOCLOSE ANCHORGUIDE 5/12 & 8/15 US; MODEL NUMBER NCA515-U; QTY 10 AND05391529640019; NEOCLOSE AUTOANCHOR X2 PACK US; MODEL NUMBER NCA2-U;  QTY 10: Brand: NEOCLOSE ANCHORGUIDE REGULAR PORT CLOSURE KIT US

## (undated) DEVICE — GLV SURG SENSICARE MICRO PF LF 6.5 STRL

## (undated) DEVICE — TIP COVER ACCESSORY

## (undated) DEVICE — GOWN,NON-REINFORCED,SIRUS,SET IN SLV,XL: Brand: MEDLINE

## (undated) DEVICE — PENCL E/S HNDSWCH ROCKRBTN HOLSTR 10FT

## (undated) DEVICE — ANTIBACTERIAL UNDYED BRAIDED (POLYGLACTIN 910), SYNTHETIC ABSORBABLE SUTURE: Brand: COATED VICRYL

## (undated) DEVICE — SUT MNCRYL PLS ANTIB UD 3/0 PS2 27IN

## (undated) DEVICE — TUBING, SUCTION, 1/4" X 10', STRAIGHT: Brand: MEDLINE

## (undated) DEVICE — OCCL COLPO PNEUMO  STRL

## (undated) DEVICE — SPNG LAP PREWSH SFTPK 18X18IN STRL PK/5

## (undated) DEVICE — Device

## (undated) DEVICE — SKIN AFFIX SURG ADHESIVE 72/CS 0.55ML: Brand: MEDLINE

## (undated) DEVICE — KT POSTN TRENDELENBURG DLX WING PAD TABL STRAP HDRST XL 1P/U

## (undated) DEVICE — COVER,LIGHT HANDLE,FLX,1/PK: Brand: MEDLINE INDUSTRIES, INC.

## (undated) DEVICE — ENDOPATH PNEUMONEEDLE INSUFFLATION NEEDLES WITH LUER LOCK CONNECTORS 120MM: Brand: ENDOPATH

## (undated) DEVICE — PK MAJ GYN DAVINCI 10